# Patient Record
Sex: MALE | Race: WHITE | Employment: STUDENT | URBAN - METROPOLITAN AREA
[De-identification: names, ages, dates, MRNs, and addresses within clinical notes are randomized per-mention and may not be internally consistent; named-entity substitution may affect disease eponyms.]

---
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
## 2023-02-06 DIAGNOSIS — F41.0 PANIC ATTACKS: ICD-10-CM

## 2023-02-06 RX ORDER — ESCITALOPRAM OXALATE 10 MG/1
TABLET ORAL
Qty: 90 TABLET | Refills: 0 | OUTPATIENT
Start: 2023-02-06

## 2023-05-19 ENCOUNTER — MED REC SCAN ONLY (OUTPATIENT)
Dept: INTERNAL MEDICINE CLINIC | Facility: CLINIC | Age: 18
End: 2023-05-19

## 2024-08-08 ENCOUNTER — MED REC SCAN ONLY (OUTPATIENT)
Dept: INTERNAL MEDICINE CLINIC | Facility: CLINIC | Age: 19
End: 2024-08-08

## 2024-12-17 ENCOUNTER — OFFICE VISIT (OUTPATIENT)
Facility: LOCATION | Age: 19
End: 2024-12-17
Payer: COMMERCIAL

## 2024-12-17 DIAGNOSIS — J30.2 SEASONAL ALLERGIC RHINITIS DUE TO FUNGAL SPORES: ICD-10-CM

## 2024-12-17 DIAGNOSIS — J34.2 DEVIATED SEPTUM: ICD-10-CM

## 2024-12-17 DIAGNOSIS — J32.8 OTHER CHRONIC SINUSITIS: Primary | ICD-10-CM

## 2024-12-17 PROCEDURE — 99204 OFFICE O/P NEW MOD 45 MIN: CPT | Performed by: OTOLARYNGOLOGY

## 2024-12-18 NOTE — PROGRESS NOTES
Odin Merida is a 19 year old male.   Chief Complaint   Patient presents with    Sinus Problem     HPI:   He has a history of chronic sinus issues.  He is status post ear tubes adenoidectomy by myself many years ago.  He tried Sudafed and Flonase.  He is also tried Zyrtec-D.  He saw an allergist and he tested positive to mold and dust.  He is also tried Flonase.  No current outpatient medications on file.      Past Medical History:    Abdominal pain    Bad breath    Belching    Bloating    Constipation    Extrinsic asthma, unspecified    probably induced by exeercise    Fatigue    Frequent use of laxatives    Heartburn    Loss of appetite    Sleep disturbance    Uncomfortable fullness after meals      Social History:  Social History     Socioeconomic History    Marital status: Single   Tobacco Use    Smoking status: Never    Smokeless tobacco: Never   Vaping Use    Vaping status: Never Used   Substance and Sexual Activity    Alcohol use: Yes     Alcohol/week: 1.0 - 2.0 standard drink of alcohol     Types: 1 - 2 Cans of beer per week    Drug use: No      Past Surgical History:   Procedure Laterality Date    Adenoidectomy      Egd      Myringotomy, laser-assisted           REVIEW OF SYSTEMS:   GENERAL HEALTH: feels well otherwise  GENERAL : denies fever, chills, sweats, weight loss, weight gain  SKIN: denies any unusual skin lesions or rashes  RESPIRATORY: denies shortness of breath with exertion  NEURO: denies headaches    EXAM:   There were no vitals taken for this visit.    System Findings Details   Constitutional  Overall appearance - Normal.   Psychiatric  Orientation - Oriented to time, place, person & situation. Appropriate mood and affect.   Head/Face  Facial features -- Normal. Skull - Normal.   Eyes  Pupils equal ,round ,react to light and accomidate   Ears, Nose, Throat, Neck  Ears clear nose erythemic congestion with septal deviation oropharynx erythema neck no masses   Neurological  Memory - Normal.  Cranial nerves - Cranial nerves II through XII grossly intact.   Lymph Detail  Submental. Submandibular. Anterior cervical. Posterior cervical. Supraclavicular.       ASSESSMENT AND PLAN:   1. Other chronic sinusitis  I am concerned that chronic sinusitis is playing a role in his overall nasal congestion and problems.  He will undergo a dedicated CT of the sinus and see me back.  - CT SINUS [facility] (CPT=70486); Future    2. Seasonal allergic rhinitis due to fungal spores  He is being treated by the allergist.    3. Deviated septum  Contributing to nasal airway obstruction.      The patient indicates understanding of these issues and agrees to the plan.    No follow-ups on file.    Hernán Garibay MD  12/18/2024  9:14 AM

## 2024-12-24 ENCOUNTER — HOSPITAL ENCOUNTER (OUTPATIENT)
Dept: CT IMAGING | Age: 19
Discharge: HOME | End: 2024-12-24
Attending: OTOLARYNGOLOGY
Payer: COMMERCIAL

## 2024-12-24 DIAGNOSIS — J32.8 OTHER CHRONIC SINUSITIS: ICD-10-CM

## 2024-12-24 PROCEDURE — 70486 CT MAXILLOFACIAL W/O DYE: CPT | Performed by: OTOLARYNGOLOGY

## 2024-12-26 ENCOUNTER — TELEPHONE (OUTPATIENT)
Facility: LOCATION | Age: 19
End: 2024-12-26

## 2024-12-26 NOTE — PROGRESS NOTES
Results reviewed. Tests show no significant abnormalities. Patient informed via phone call.  There is no significant chronic sinusitis.  There is a deviated septum and turbinate hypertrophy.

## 2024-12-27 NOTE — TELEPHONE ENCOUNTER
I spoke with patient.  He gets nasal congestion he also gets pressure and sometimes headaches.  CT of the sinuses fairly clear save for the deviated septum and turbinate hypertrophy.  We have discussed septoplasty.  Prior to proceeding with the above I want him to use Afrin daily for the for 5 days and monitor his nasal congestion and headaches.  He will then see me back in follow-up.  He could also see a neurologist to get their consult regarding headaches.

## 2025-01-08 ENCOUNTER — TELEPHONE (OUTPATIENT)
Facility: LOCATION | Age: 20
End: 2025-01-08

## 2025-01-08 NOTE — TELEPHONE ENCOUNTER
Spoke with Mother Ria discussed surgical dates for Feb/March 2025 tentative sinus surgery will discuss more with them on 02/04/2025 f/u appt jono

## 2025-02-04 ENCOUNTER — OFFICE VISIT (OUTPATIENT)
Facility: LOCATION | Age: 20
End: 2025-02-04
Payer: COMMERCIAL

## 2025-02-04 DIAGNOSIS — J32.8 OTHER CHRONIC SINUSITIS: Primary | ICD-10-CM

## 2025-02-04 DIAGNOSIS — J34.2 DEVIATED SEPTUM: ICD-10-CM

## 2025-02-04 PROCEDURE — 99214 OFFICE O/P EST MOD 30 MIN: CPT | Performed by: OTOLARYNGOLOGY

## 2025-02-05 DIAGNOSIS — J32.0 CHRONIC MAXILLARY SINUSITIS: ICD-10-CM

## 2025-02-05 DIAGNOSIS — J34.2 DEVIATED NASAL SEPTUM: Primary | ICD-10-CM

## 2025-02-05 DIAGNOSIS — J34.3 HYPERTROPHY OF NASAL TURBINATES: ICD-10-CM

## 2025-02-05 NOTE — PROGRESS NOTES
Odin Merida is a 19 year old male. No chief complaint on file.    HPI:   He has had sinus problems for years.  He has had allergies.  He is using Sudafed and Flonase.  He is use Zyrtec-D.  Despite the above he has chronic congestion.  He usually gets a few sinus infections each year but he was on antibiotics for almost 3 months straight from November December and January.    REVIEW OF SYSTEMS:   GENERAL HEALTH: feels well otherwise  GENERAL : denies fever, chills, sweats, weight loss, weight gain  SKIN: denies any unusual skin lesions or rashes  RESPIRATORY: denies shortness of breath with exertion  NEURO: denies headaches    EXAM:   There were no vitals taken for this visit.    System Findings Details   Constitutional  Overall appearance - Normal.   Psychiatric  Orientation - Oriented to time, place, person & situation. Appropriate mood and affect.   Head/Face  Facial features -- Normal. Skull - Normal.   Eyes  Pupils equal ,round ,react to light and accomidate   Ears, Nose, Throat, Neck  Ears clear nose erythema and congestion with septal deviation to the left and turbinate hypertrophy oropharynx erythema and cobblestoning   Neurological  Memory - Normal. Cranial nerves - Cranial nerves II through XII grossly intact.   Lymph Detail  Submental. Submandibular. Anterior cervical. Posterior cervical. Supraclavicular.       ASSESSMENT AND PLAN:   1. Other chronic sinusitis  CT of the sinus reviewed.  This shows ostiomeatal unit obstruction bilaterally.  This shows mucosal disease in the maxillary and anterior ethmoid sinuses.  Shows a deviated septum to the left with turbinate hypertrophy and nasal airway obstruction.    2. Deviated septum  Given the chronicity of complaints and nonresolution of symptoms is reasonable to proceed with endoscopic sinus procedure.  This would include balloon sinuplasty and x-ray sinus along with septoplasty and reduction of the turbinates.The risk benefits and alternatives of sinus  surgery were explained to the patient.  The risks are to include but not limited to bleeding infection ocular brain injury scar band formation septal perforation and nonresolution of symptoms.        The patient indicates understanding of these issues and agrees to the plan.    No follow-ups on file.    Hernán Garibay MD  2/4/2025  6:29 PM

## 2025-02-25 NOTE — H&P
[facility]  History & Physical    Odin Merida Patient Status:  Hospital Outpatient Surgery    2005 MRN [id number]   Location [facility] SURGERY Attending Hernán Garibay MD   Hosp Day # 0 PCP David Head MD     History of Present Illness:  Odin Merida is a(n) 19 year old male.  Patient with chronic sinus problems for years.  He has allergies.  He tried Sudafed and Flonase and Zyrtec-D.    History:  Past Medical History:    Abdominal pain    Bad breath    Belching    Bloating    Constipation    Extrinsic asthma, unspecified    probably induced by exeercise    Fatigue    Frequent use of laxatives    Heartburn    Loss of appetite    Sleep disturbance    Uncomfortable fullness after meals     Past Surgical History:   Procedure Laterality Date    Adenoidectomy      Egd      Myringotomy, laser-assisted       No family history on file.   reports that he has never smoked. He has never used smokeless tobacco. He reports current alcohol use of about 1.0 - 2.0 standard drink of alcohol per week. He reports that he does not use drugs.    Allergies:  Allergies[1]    Home Medications:  Prescriptions Prior to Admission[2]    Physical Exam:   General: Alert, orientated x3.  Cooperative.  No apparent distress.  Vital Signs:  There were no vitals taken for this visit.  HEENT septal deviation to the left with turbinate hypertrophy  Neck: No tenderness to palpitation.  Full range of motion to flexion and extension, lateral rotation and lateral flexion of cervical spine.  No JVD. Supple.   Lungs: Clear to auscultation bilaterally.  Cardiac: Regular rate and rhythm. No murmur.  Abdomen:  Soft, non-distended, non-tender, with no rebound or guarding.  No peritoneal signs. No ascites.  Liver is within normal limits.  Spleen is not palpable.    Extremities:  No lower extremity edema noted.  Without clubbing or cyanosis.    Skin: Normal texture and turgor.  Lymphatic:  No palpable cervical  lymphadenopathy.  Neurologic: Cranial nerves are grossly intact.  Motor strength and sensory examination is grossly normal.  No focal neurologic deficit.    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    Chronic sinusitis with deviated nasal septum and turbinate hypertrophy.    Bilateral endoscopic max antrostomy via balloon sinuplasty along with septoplasty outfracture and reduction of the turbinates.        Hernán Garibay MD  2/25/2025  10:25 AM         [1] No Known Allergies  [2]   No medications prior to admission.

## 2025-02-28 RX ORDER — LANSOPRAZOLE 30 MG/1
30 CAPSULE, DELAYED RELEASE ORAL
COMMUNITY

## 2025-02-28 RX ORDER — ELETRIPTAN HYDROBROMIDE 40 MG/1
40 TABLET, FILM COATED ORAL AS NEEDED
COMMUNITY

## 2025-03-10 ENCOUNTER — ANESTHESIA EVENT (OUTPATIENT)
Dept: SURGERY | Facility: HOSPITAL | Age: 20
End: 2025-03-10
Payer: COMMERCIAL

## 2025-03-11 ENCOUNTER — HOSPITAL ENCOUNTER (OUTPATIENT)
Facility: HOSPITAL | Age: 20
Setting detail: HOSPITAL OUTPATIENT SURGERY
Discharge: HOME | End: 2025-03-11
Attending: OTOLARYNGOLOGY | Admitting: OTOLARYNGOLOGY
Payer: COMMERCIAL

## 2025-03-11 ENCOUNTER — TELEPHONE (OUTPATIENT)
Facility: LOCATION | Age: 20
End: 2025-03-11

## 2025-03-11 ENCOUNTER — ANESTHESIA (OUTPATIENT)
Dept: SURGERY | Facility: HOSPITAL | Age: 20
End: 2025-03-11
Payer: COMMERCIAL

## 2025-03-11 VITALS
OXYGEN SATURATION: 97 % | BODY MASS INDEX: 25.33 KG/M2 | HEART RATE: 64 BPM | WEIGHT: 208 LBS | TEMPERATURE: 97 F | HEIGHT: 76 IN | DIASTOLIC BLOOD PRESSURE: 91 MMHG | RESPIRATION RATE: 17 BRPM | SYSTOLIC BLOOD PRESSURE: 133 MMHG

## 2025-03-11 DIAGNOSIS — J32.4 CHRONIC PANSINUSITIS: Primary | ICD-10-CM

## 2025-03-11 PROBLEM — J34.2 DEVIATED SEPTUM: Status: ACTIVE | Noted: 2025-03-11

## 2025-03-11 PROCEDURE — 09QQ4ZZ REPAIR RIGHT MAXILLARY SINUS, PERCUTANEOUS ENDOSCOPIC APPROACH: ICD-10-PCS | Performed by: OTOLARYNGOLOGY

## 2025-03-11 PROCEDURE — 09BM8ZZ EXCISION OF NASAL SEPTUM, VIA NATURAL OR ARTIFICIAL OPENING ENDOSCOPIC: ICD-10-PCS | Performed by: OTOLARYNGOLOGY

## 2025-03-11 PROCEDURE — 09QR4ZZ REPAIR LEFT MAXILLARY SINUS, PERCUTANEOUS ENDOSCOPIC APPROACH: ICD-10-PCS | Performed by: OTOLARYNGOLOGY

## 2025-03-11 PROCEDURE — 31295 NSL/SINS NDSC SURG MAX SINS: CPT | Performed by: OTOLARYNGOLOGY

## 2025-03-11 PROCEDURE — 30520 REPAIR OF NASAL SEPTUM: CPT | Performed by: OTOLARYNGOLOGY

## 2025-03-11 PROCEDURE — 09BL7ZZ EXCISION OF NASAL TURBINATE, VIA NATURAL OR ARTIFICIAL OPENING: ICD-10-PCS | Performed by: OTOLARYNGOLOGY

## 2025-03-11 PROCEDURE — 30140 RESECT INFERIOR TURBINATE: CPT | Performed by: OTOLARYNGOLOGY

## 2025-03-11 RX ORDER — DEXAMETHASONE SODIUM PHOSPHATE 4 MG/ML
VIAL (ML) INJECTION AS NEEDED
Status: DISCONTINUED | OUTPATIENT
Start: 2025-03-11 | End: 2025-03-11 | Stop reason: SURG

## 2025-03-11 RX ORDER — CEFADROXIL 500 MG/1
500 CAPSULE ORAL 2 TIMES DAILY
Qty: 14 CAPSULE | Refills: 0 | Status: SHIPPED | OUTPATIENT
Start: 2025-03-11 | End: 2025-03-18

## 2025-03-11 RX ORDER — HYDROCODONE BITARTRATE AND ACETAMINOPHEN 10; 325 MG/1; MG/1
1 TABLET ORAL ONCE AS NEEDED
Status: COMPLETED | OUTPATIENT
Start: 2025-03-11 | End: 2025-03-11

## 2025-03-11 RX ORDER — ONDANSETRON 2 MG/ML
4 INJECTION INTRAMUSCULAR; INTRAVENOUS EVERY 6 HOURS PRN
Status: DISCONTINUED | OUTPATIENT
Start: 2025-03-11 | End: 2025-03-11

## 2025-03-11 RX ORDER — SCOPOLAMINE 1 MG/3D
1 PATCH, EXTENDED RELEASE TRANSDERMAL ONCE
Status: DISCONTINUED | OUTPATIENT
Start: 2025-03-11 | End: 2025-03-11 | Stop reason: HOSPADM

## 2025-03-11 RX ORDER — SODIUM CHLORIDE, SODIUM LACTATE, POTASSIUM CHLORIDE, CALCIUM CHLORIDE 600; 310; 30; 20 MG/100ML; MG/100ML; MG/100ML; MG/100ML
INJECTION, SOLUTION INTRAVENOUS CONTINUOUS
Status: DISCONTINUED | OUTPATIENT
Start: 2025-03-11 | End: 2025-03-11

## 2025-03-11 RX ORDER — MIDAZOLAM HYDROCHLORIDE 1 MG/ML
1 INJECTION INTRAMUSCULAR; INTRAVENOUS EVERY 5 MIN PRN
Status: DISCONTINUED | OUTPATIENT
Start: 2025-03-11 | End: 2025-03-11

## 2025-03-11 RX ORDER — HYDROMORPHONE HYDROCHLORIDE 1 MG/ML
0.4 INJECTION, SOLUTION INTRAMUSCULAR; INTRAVENOUS; SUBCUTANEOUS EVERY 5 MIN PRN
Status: DISCONTINUED | OUTPATIENT
Start: 2025-03-11 | End: 2025-03-11

## 2025-03-11 RX ORDER — MEPERIDINE HYDROCHLORIDE 25 MG/ML
12.5 INJECTION INTRAMUSCULAR; INTRAVENOUS; SUBCUTANEOUS AS NEEDED
Status: DISCONTINUED | OUTPATIENT
Start: 2025-03-11 | End: 2025-03-11

## 2025-03-11 RX ORDER — ACETAMINOPHEN 500 MG
1000 TABLET ORAL ONCE AS NEEDED
Status: COMPLETED | OUTPATIENT
Start: 2025-03-11 | End: 2025-03-11

## 2025-03-11 RX ORDER — LABETALOL HYDROCHLORIDE 5 MG/ML
5 INJECTION, SOLUTION INTRAVENOUS EVERY 5 MIN PRN
Status: DISCONTINUED | OUTPATIENT
Start: 2025-03-11 | End: 2025-03-11

## 2025-03-11 RX ORDER — LIDOCAINE HYDROCHLORIDE AND EPINEPHRINE 10; 10 MG/ML; UG/ML
INJECTION, SOLUTION INFILTRATION; PERINEURAL AS NEEDED
Status: DISCONTINUED | OUTPATIENT
Start: 2025-03-11 | End: 2025-03-11 | Stop reason: HOSPADM

## 2025-03-11 RX ORDER — HYDROMORPHONE HYDROCHLORIDE 1 MG/ML
0.2 INJECTION, SOLUTION INTRAMUSCULAR; INTRAVENOUS; SUBCUTANEOUS EVERY 5 MIN PRN
Status: DISCONTINUED | OUTPATIENT
Start: 2025-03-11 | End: 2025-03-11

## 2025-03-11 RX ORDER — NALOXONE HYDROCHLORIDE 0.4 MG/ML
80 INJECTION, SOLUTION INTRAMUSCULAR; INTRAVENOUS; SUBCUTANEOUS AS NEEDED
Status: DISCONTINUED | OUTPATIENT
Start: 2025-03-11 | End: 2025-03-11

## 2025-03-11 RX ORDER — ROCURONIUM BROMIDE 10 MG/ML
INJECTION, SOLUTION INTRAVENOUS AS NEEDED
Status: DISCONTINUED | OUTPATIENT
Start: 2025-03-11 | End: 2025-03-11 | Stop reason: SURG

## 2025-03-11 RX ORDER — HYDROMORPHONE HYDROCHLORIDE 1 MG/ML
0.6 INJECTION, SOLUTION INTRAMUSCULAR; INTRAVENOUS; SUBCUTANEOUS EVERY 5 MIN PRN
Status: DISCONTINUED | OUTPATIENT
Start: 2025-03-11 | End: 2025-03-11

## 2025-03-11 RX ORDER — ONDANSETRON 2 MG/ML
INJECTION INTRAMUSCULAR; INTRAVENOUS AS NEEDED
Status: DISCONTINUED | OUTPATIENT
Start: 2025-03-11 | End: 2025-03-11 | Stop reason: SURG

## 2025-03-11 RX ORDER — HYDROCODONE BITARTRATE AND ACETAMINOPHEN 5; 325 MG/1; MG/1
1-2 TABLET ORAL EVERY 4 HOURS PRN
Qty: 15 TABLET | Refills: 0 | Status: SHIPPED | OUTPATIENT
Start: 2025-03-11

## 2025-03-11 RX ORDER — LIDOCAINE HYDROCHLORIDE 10 MG/ML
INJECTION, SOLUTION EPIDURAL; INFILTRATION; INTRACAUDAL; PERINEURAL AS NEEDED
Status: DISCONTINUED | OUTPATIENT
Start: 2025-03-11 | End: 2025-03-11 | Stop reason: SURG

## 2025-03-11 RX ORDER — ACETAMINOPHEN 500 MG
1000 TABLET ORAL ONCE
Status: DISCONTINUED | OUTPATIENT
Start: 2025-03-11 | End: 2025-03-11 | Stop reason: HOSPADM

## 2025-03-11 RX ORDER — HYDROCODONE BITARTRATE AND ACETAMINOPHEN 10; 325 MG/1; MG/1
2 TABLET ORAL ONCE AS NEEDED
Status: COMPLETED | OUTPATIENT
Start: 2025-03-11 | End: 2025-03-11

## 2025-03-11 RX ORDER — PROCHLORPERAZINE EDISYLATE 5 MG/ML
5 INJECTION INTRAMUSCULAR; INTRAVENOUS EVERY 8 HOURS PRN
Status: DISCONTINUED | OUTPATIENT
Start: 2025-03-11 | End: 2025-03-11

## 2025-03-11 RX ADMIN — ONDANSETRON 4 MG: 2 INJECTION INTRAMUSCULAR; INTRAVENOUS at 10:02:00

## 2025-03-11 RX ADMIN — LIDOCAINE HYDROCHLORIDE 50 MG: 10 INJECTION, SOLUTION EPIDURAL; INFILTRATION; INTRACAUDAL; PERINEURAL at 09:16:00

## 2025-03-11 RX ADMIN — DEXAMETHASONE SODIUM PHOSPHATE 8 MG: 4 MG/ML VIAL (ML) INJECTION at 09:16:00

## 2025-03-11 RX ADMIN — ROCURONIUM BROMIDE 50 MG: 10 INJECTION, SOLUTION INTRAVENOUS at 09:16:00

## 2025-03-11 RX ADMIN — SODIUM CHLORIDE, SODIUM LACTATE, POTASSIUM CHLORIDE, CALCIUM CHLORIDE: 600; 310; 30; 20 INJECTION, SOLUTION INTRAVENOUS at 10:18:00

## 2025-03-11 NOTE — OPERATIVE REPORT
[facility]  Operative Note    Odin Merida Location: OR   [facility] [id number] MRN [id number]   Admission Date 3/11/2025 Operation Date 3/11/2025   Attending Physician Hernán Garibay MD Operating Physician Hernán Garibay MD       OPERATIVE REPORT   PREOPERATIVE DIAGNOSIS:   1. Nasal septal deviation.   2. Inferior turbinate hypertrophy.   3.  Chronic maxillary sinusitis  POSTOPERATIVE DIAGNOSIS:   1. Nasal septal deviation.   2. Inferior turbinate hypertrophy.   3.  Chronic maxillary sinusitis  PROCEDURE PERFORMED:   1. Nasal septoplasty.   2. Outfracture and shaver submucous reduction of inferior turbinates.   3.  Bilateral endoscopic maxillary antrostomy via entellus balloon sinuplasty  ANESTHESIA: General endotracheal.   PROCEDURE AND FINDINGS: After satisfactory general endotracheal anesthesia induction, the patient was prepared for the procedure, and 1% lidocaine with epinephrine was injected at the lateral nasal wall, the inferior turbinates, and the nasal septum. Next, 4% cocaine packs were placed into both sides of the nose. The area was then prepped and draped in the usual sterile fashion.   A #15-blade was used to make an incision at the nasal septum. A mucoperichondrial flap was then raised using a Marleni elevator. An incision was made at the bony cartilage junction with the [place]man elevator, then a mucoperiosteal flap was raised on the other side. Deviated nasal vomer and ethmoid bone were removed using Luis Fernando forceps. Inferiorly, the maxillary crest spur was removed with an osteotome. The septal mucosal edges were then closed with the septal stapler. A small drainage incision was made posteriorly.   Attention was turned to the turbinates. Each inferior turbinate was outfractured with a #7 osteotome. The shaver was then used to make submucosal tunnels in each inferior turbinate removing submucosa and bone, thereby reducing their size.   The 0 degree endoscope was utilized.  The left side was  visualized.  The patient was noted to have an anterior flexed uncinate process.  The maxillary sinus probe was used to find the natural ostium to the left maxillary sinus.  The entellus balloon with a maxillary sinus bend was then dropped into the natural ostium.  The lighted guidewire was advanced and noted be well within the sinus.  The balloon was advanced over the guide and then serial dilation was performed of the natural ostium to the left maxillary sinus thereby creating a maxillary antrostomy.  The same procedure was performed on the right-hand side.  Once again the patient had an anterior flexed uncinate process and a very tight maxillary sinus ostium.  At this point, the procedure ended.  Packing was placed into both sides of the nose. The patient was then awakened, extubated, and transferred to the recovery room in stable condition.   FINDINGS: The patient was noted to have anterior flexed uncinate process also tight ostiomeatal unit and maxillary sinus openings.  Patient had a nasal septal spur to the right along with bilateral inferior turbinate hypertrophy    Hernán Garibay MD

## 2025-03-11 NOTE — ANESTHESIA POSTPROCEDURE EVALUATION
[facility]    Odin Merida Patient Status:  Hospital Outpatient Surgery   Age/Gender 19 year old male MRN [id number]   Location [facility] SURGERY Attending Hernán Garibay MD   [facility] Day # 0 PCP David Head MD       Anesthesia Post-op Note    Nasal Septoplasty; Bilateral Out Fracture of Inferior Turbinates; Bilateral Submucous Resection of Inferior Turbinates; Bilateral Maxillary Balloon Sinuplasty;    Procedure Summary       Date: 03/11/25 Room / Location:  MAIN OR 02 / [facility] MAIN OR    Anesthesia Start: 0912 Anesthesia Stop: 1018    Procedures:       Nasal Septoplasty; Bilateral Out Fracture of Inferior Turbinates; Bilateral Submucous Resection of Inferior Turbinates; Bilateral Maxillary Balloon Sinuplasty; (Bilateral)      . (Bilateral) Diagnosis:       Deviated nasal septum      Hypertrophy of nasal turbinates      Chronic maxillary sinusitis      (Deviated nasal septum [J34.2]Hypertrophy of nasal turbinates [J34.3]Chronic maxillary sinusitis [J32.0])    Surgeons: Hernán Garibay MD Anesthesiologist: Galindo Strange MD    Anesthesia Type: general ASA Status: 2            Anesthesia Type: general    Vitals Value Taken Time   /79 03/11/25 1019   Temp 99.4 °F (37.4 °C) 03/11/25 1018   Pulse 86 03/11/25 1018   Resp 13 03/11/25 1018   SpO2 96 % 03/11/25 1018   Vitals shown include unfiled device data.        Patient Location: PACU    Anesthesia Type: general    Airway Patency: patent and extubated    Mental Status: mildly sedated but able to meaningfully participate in the post-anesthesia evaluation    Nausea/Vomiting: none    Cardiopulmonary/Hydration status: stable euvolemic    Complications: no apparent anesthesia related complications    Postop vital signs: stable    Dental Exam: Unchanged from Preop    Patient to be discharged from PACU when criteria met.

## 2025-03-11 NOTE — INTERVAL H&P NOTE
Pre-op Diagnosis: Deviated nasal septum [J34.2]  Hypertrophy of nasal turbinates [J34.3]  Chronic maxillary sinusitis [J32.0]    The above referenced H&P was reviewed by Hernán Garibay MD on 3/11/2025, the patient was examined and no significant changes have occurred in the patient's condition since the H&P was performed.  I discussed with the patient and/or legal representative the potential benefits, risks and side effects of this procedure; the likelihood of the patient achieving goals; and potential problems that might occur during recuperation.  I discussed reasonable alternatives to the procedure, including risks, benefits and side effects related to the alternatives and risks related to not receiving this procedure.  We will proceed with procedure as planned.

## 2025-03-11 NOTE — ANESTHESIA PROCEDURE NOTES
Airway  Date/Time: 3/11/2025 9:18 AM  Urgency: elective      General Information and Staff    Patient location during procedure: OR  Anesthesiologist: Galindo Strange MD  Performed: anesthesiologist   Performed by: Galindo Strange MD  Authorized by: Galindo Strange MD      Indications and Patient Condition  Indications for airway management: anesthesia  Sedation level: deep  Preoxygenated: yes  Patient position: sniffing  Mask difficulty assessment: 1 - vent by mask    Final Airway Details  Final airway type: endotracheal airway      Successful airway: ETT and oral JOCELIN  Cuffed: yes   Successful intubation technique: direct laryngoscopy  Endotracheal tube insertion site: oral  Blade: Andree  Blade size: #4  ETT size (mm): 7.0    Cormack-Lehane Classification: grade I - full view of glottis  Placement verified by: capnometry   Measured from: lips  Number of attempts at approach: 1

## 2025-03-11 NOTE — ANESTHESIA PREPROCEDURE EVALUATION
PRE-OP EVALUATION    Patient Name: Odin Merida    Admit Diagnosis: Deviated nasal septum [J34.2]  Hypertrophy of nasal turbinates [J34.3]  Chronic maxillary sinusitis [J32.0]    Pre-op Diagnosis: Deviated nasal septum [J34.2]  Hypertrophy of nasal turbinates [J34.3]  Chronic maxillary sinusitis [J32.0]    Nasal Septoplasty; Bilateral Out Fracture of Inferior Turbinates; Bilateral Submucous Resection of Inferior Turbinates; Bilateral Maxillary Balloon Sinuplasty;  possible [employer] ENT Navigation Stealth    Anesthesia Procedure: Nasal Septoplasty; Bilateral Out Fracture of Inferior Turbinates; Bilateral Submucous Resection of Inferior Turbinates; Bilateral Maxillary Balloon Sinuplasty; possible [employer] ENT Navigation Stealth (Bilateral)  . (Bilateral)    Surgeons and Role:     * Hernán Garibay MD - Primary    Pre-op vitals reviewed.        Body mass index is 26.17 kg/m².    Current medications reviewed.  Hospital Medications:  No current facility-administered medications on file as of .       Outpatient Medications:   Prescriptions Prior to Admission[1]    Allergies: Patient has no known allergies.      Anesthesia Evaluation    Patient summary reviewed.    Anesthetic Complications           GI/Hepatic/Renal      (+) GERD                           Cardiovascular                                                       Endo/Other                                  Pulmonary      (+) asthma                     Neuro/Psych        (+) anxiety                              Past Surgical History:   Procedure Laterality Date    Adenoidectomy      Egd      Myringotomy, laser-assisted       Social History     Socioeconomic History    Marital status: Single   Tobacco Use    Smoking status: Never    Smokeless tobacco: Never   Vaping Use    Vaping status: Never Used   Substance and Sexual Activity    Alcohol use: Not Currently    Drug use: No     History   Drug Use No     Available pre-op labs reviewed.                ASA:  2   Plan: general  NPO status verified and           Plan/risks discussed with: patient                Present on Admission:  **None**             [1]   No medications prior to admission.

## 2025-03-13 ENCOUNTER — MED REC SCAN ONLY (OUTPATIENT)
Facility: LOCATION | Age: 20
End: 2025-03-13

## 2025-03-13 ENCOUNTER — TELEPHONE (OUTPATIENT)
Facility: LOCATION | Age: 20
End: 2025-03-13

## 2025-03-13 NOTE — TELEPHONE ENCOUNTER
Patients mother called wondering what to do to help bleeding and mucus that is coming out in waves.    Per Dr. Garibay patient is to use 4 squirts of Afrin in each nostril. Notified patient that this is normal.      Also informed patient that if does not stop then to call back

## 2025-03-18 ENCOUNTER — OFFICE VISIT (OUTPATIENT)
Facility: LOCATION | Age: 20
End: 2025-03-18
Payer: COMMERCIAL

## 2025-03-18 DIAGNOSIS — J32.8 OTHER CHRONIC SINUSITIS: Primary | ICD-10-CM

## 2025-03-18 DIAGNOSIS — J34.2 DEVIATED SEPTUM: ICD-10-CM

## 2025-03-18 PROCEDURE — 31237 NSL/SINS NDSC SURG BX POLYPC: CPT | Performed by: OTOLARYNGOLOGY

## 2025-03-18 NOTE — PROGRESS NOTES
This patient is status post endoscopic sinus procedure 3/11/2025 in for his first postoperative debridement procedure.  Patient was topically anesthetized with anesthetic spray.  He was debrided using the 0 degree sinus scope bilaterally.  Patient tolerated the procedure well.  He was given  routine post debridement instructions.  He will follow-up as needed.  He is status post balloon sinuplasty maxillary sinus along with septoplasty and reduction of the turbinates.  He is healing well.  He will continue with saline regularly.  He may add some Flonase and Sudafed.  He need not come back for another cleaning unless any symptoms persist in the next 3 to 4 weeks.

## (undated) DEVICE — SOLUTION IRRIG 1000ML ST H2O AQUALITE PLAS

## (undated) DEVICE — TUBING 1895522 5PK STRAIGHTSHOT TO XPS: Brand: STRAIGHTSHOT®

## (undated) DEVICE — SYRINGE MED 10ML LL CTRL W/ FNGR GRP CLR BRL

## (undated) DEVICE — SLEEVE COMPR MD KNEE LEN SGL USE KENDALL SCD

## (undated) DEVICE — PACK CDS SINUS

## (undated) DEVICE — ENTACT SEPTAL STAPLER 3 PACK: Brand: ENT SINUS

## (undated) DEVICE — DILATOR ENDOSCP 6X20MM SNUS SUP SLEEK PROF

## (undated) DEVICE — INSTRUMENT TRACKER 9733533XOM ENT 1PK

## (undated) DEVICE — SPLINT INTNSL W0.6XL2IN CHITOSAN POLYMR

## (undated) DEVICE — BLADE 1884080EM TRICUT 4MMX13CM M4 ROHS: Brand: FUSION®

## (undated) DEVICE — DALE NASAL DRESSING HOLDER, FITS MOST: Brand: DALE NASAL DRESSING HOLDER

## (undated) DEVICE — GLOVE SUR 7.5 SENSICARE PI PIP CRM PWD F

## (undated) DEVICE — SKIN REG/FINE DUAL MARKER, RULER, LABELS: Brand: MEDLINE

## (undated) DEVICE — PATIENT TRACKER 9734887XOM NON-INVASIVE

## (undated) NOTE — LETTER
Date & Time: 3/12/2025, 11:21 AM  Patient: Odin Merida      To Whom It May Concern:    Odin Merida was seen and treated at [facility] on 3/11/2025.   He may return to work on 03/24/2025 .    If you have any questions or concerns, please do not hesitate to call.       Hernán Garibay M.D.  _____________________________  Physician/APC Signature